# Patient Record
Sex: FEMALE | Race: WHITE | NOT HISPANIC OR LATINO | URBAN - METROPOLITAN AREA
[De-identification: names, ages, dates, MRNs, and addresses within clinical notes are randomized per-mention and may not be internally consistent; named-entity substitution may affect disease eponyms.]

---

## 2018-01-01 ENCOUNTER — EMERGENCY (EMERGENCY)
Facility: HOSPITAL | Age: 58
LOS: 0 days | Discharge: HOME | End: 2018-01-01
Attending: EMERGENCY MEDICINE | Admitting: EMERGENCY MEDICINE
Payer: COMMERCIAL

## 2018-01-01 VITALS
SYSTOLIC BLOOD PRESSURE: 131 MMHG | OXYGEN SATURATION: 100 % | DIASTOLIC BLOOD PRESSURE: 79 MMHG | RESPIRATION RATE: 17 BRPM | HEART RATE: 95 BPM | TEMPERATURE: 99 F

## 2018-01-01 VITALS — HEIGHT: 64 IN | WEIGHT: 164.91 LBS

## 2018-01-01 DIAGNOSIS — R21 RASH AND OTHER NONSPECIFIC SKIN ERUPTION: ICD-10-CM

## 2018-01-01 DIAGNOSIS — R11.2 NAUSEA WITH VOMITING, UNSPECIFIED: ICD-10-CM

## 2018-01-01 PROCEDURE — 99283 EMERGENCY DEPT VISIT LOW MDM: CPT

## 2018-01-01 RX ORDER — FAMOTIDINE 10 MG/ML
1 INJECTION INTRAVENOUS
Qty: 4 | Refills: 0 | OUTPATIENT
Start: 2018-01-01 | End: 2018-01-04

## 2018-01-01 RX ORDER — PANTOPRAZOLE SODIUM 20 MG/1
0 TABLET, DELAYED RELEASE ORAL
Qty: 0 | Refills: 0 | COMMUNITY

## 2018-01-01 RX ORDER — FAMOTIDINE 10 MG/ML
20 INJECTION INTRAVENOUS ONCE
Qty: 0 | Refills: 0 | Status: COMPLETED | OUTPATIENT
Start: 2018-01-01 | End: 2018-01-01

## 2018-01-01 RX ORDER — DIPHENHYDRAMINE HCL 50 MG
50 CAPSULE ORAL ONCE
Qty: 0 | Refills: 0 | Status: COMPLETED | OUTPATIENT
Start: 2018-01-01 | End: 2018-01-01

## 2018-01-01 RX ORDER — SERTRALINE 25 MG/1
0 TABLET, FILM COATED ORAL
Qty: 0 | Refills: 0 | COMMUNITY

## 2018-01-01 RX ADMIN — Medication 60 MILLIGRAM(S): at 16:33

## 2018-01-01 RX ADMIN — Medication 50 MILLIGRAM(S): at 16:32

## 2018-01-01 RX ADMIN — FAMOTIDINE 20 MILLIGRAM(S): 10 INJECTION INTRAVENOUS at 16:33

## 2018-01-01 NOTE — ED STATDOCS - ATTENDING CONTRIBUTION TO CARE
I, Elia Hernandez MD,  performed the initial face to face bedside interview with this patient regarding history of present illness, review of symptoms and relevant past medical, social and family history.  I completed an independent physical examination.  I was the initial provider who evaluated this patient. I have signed out the follow up of any pending tests (i.e. labs, radiological studies) to the ACP.  I have communicated the patient’s plan of care and disposition with the ACP.  The history, relevant review of systems, past medical and surgical history, medical decision making, and physical examination was documented by the scribe in my presence and I attest to the accuracy of the documentation.  I, Elia Hernandez MD, personally saw the patient with ACP.  I have personally performed a face to face diagnostic evaluation on this patient.  I have reviewed the ACP note and agree with the history, exam, and plan of care, except as noted.

## 2018-01-01 NOTE — ED STATDOCS - OBJECTIVE STATEMENT
56 yo female presents with rash x 2 days.  Had UTI last week, was prescribed bactrim.  Around last Weds she developed a head cold, nausea, vomiting.  Rash began 2 days ago on her arms, then spread to her back and abdomen, then to her legs.  Denies throat itching or difficulty breathing.  Denies new topical products.  Has taken bactrim in the past without issues.  Took benadryl at home with some relief.

## 2018-01-01 NOTE — ED STATDOCS - PROGRESS NOTE DETAILS
signed Iram Cabrera PA-C Pt seen and evaluated initially in intake by Dr Hernandez.   Pt here with urticaric rash with wheals x 2 days, unclear etiology. No airway involvement, improves with benadryl. Plan steroids, pepcid, benadryl, outpt f/u with pts derm Pt feeling well, pt and family agree with DC and plan of care.

## 2018-01-01 NOTE — ED ADULT NURSE NOTE - OBJECTIVE STATEMENT
rash over entire body started on Saturday.  Wore new Pajamas without washing them. benadryl taken at home with some relief but rash continues to spread. Itchy red raised rash over entire body.  No shortness of breath no rash on face .

## 2019-04-16 ENCOUNTER — EMERGENCY (EMERGENCY)
Facility: HOSPITAL | Age: 59
LOS: 0 days | Discharge: HOME | End: 2019-04-16
Attending: EMERGENCY MEDICINE | Admitting: EMERGENCY MEDICINE
Payer: COMMERCIAL

## 2019-04-16 VITALS
HEART RATE: 84 BPM | SYSTOLIC BLOOD PRESSURE: 153 MMHG | RESPIRATION RATE: 18 BRPM | TEMPERATURE: 98 F | DIASTOLIC BLOOD PRESSURE: 93 MMHG | OXYGEN SATURATION: 97 %

## 2019-04-16 VITALS — WEIGHT: 149.91 LBS | HEIGHT: 59 IN

## 2019-04-16 DIAGNOSIS — Z77.098 CONTACT WITH AND (SUSPECTED) EXPOSURE TO OTHER HAZARDOUS, CHIEFLY NONMEDICINAL, CHEMICALS: ICD-10-CM

## 2019-04-16 DIAGNOSIS — R09.89 OTHER SPECIFIED SYMPTOMS AND SIGNS INVOLVING THE CIRCULATORY AND RESPIRATORY SYSTEMS: ICD-10-CM

## 2019-04-16 PROCEDURE — 70360 X-RAY EXAM OF NECK: CPT | Mod: 26

## 2019-04-16 PROCEDURE — 71046 X-RAY EXAM CHEST 2 VIEWS: CPT | Mod: 26

## 2019-04-16 PROCEDURE — 99284 EMERGENCY DEPT VISIT MOD MDM: CPT

## 2019-04-16 NOTE — ED STATDOCS - ATTENDING CONTRIBUTION TO CARE
I, Celeste Ledesma MD,  performed the initial face to face bedside interview with this patient regarding history of present illness, review of symptoms and relevant past medical, social and family history.  I completed an independent physical examination.  I was the initial provider who evaluated this patient. I have signed out the follow up of any pending tests (i.e. labs, radiological studies) to the ACP.  I have communicated the patient’s plan of care and disposition with the ACP.  The history, relevant review of systems, past medical and surgical history, medical decision making, and physical examination was documented by the scribe in my presence and I attest to the accuracy of the documentation.

## 2019-04-16 NOTE — ED STATDOCS - OBJECTIVE STATEMENT
57 y/o female with a PMHx of HLD presents to the ED c/o allergic reaction. Pt notes she was cleaning the bathroom with Clorox  for an hour and a half. Pt notes she had to step out a few times. After cleaning, pt notes she could not talk because she had no voice and had throat irritation. Pt notes she felt like she can not take a deep breath. Pt called Dr. Pierre and was told to come to ED. +throat irritation, HA. Sx are now resolving. As per family, pt has had a reaction for bleach in the past. Nonsmoker. No other complaints at this time.
diet modification/medication therapy

## 2019-04-16 NOTE — ED ADULT NURSE NOTE - CHPI ED NUR SYMPTOMS NEG
no nausea/no dizziness/no vomiting/no chills/no decreased eating/drinking/no tingling/no weakness/no pain/no fever

## 2019-04-16 NOTE — ED ADULT TRIAGE NOTE - CHIEF COMPLAINT QUOTE
patient states she was cleaning bathroom with bleach for 1.5 hours, at 11 am she had throat irritation, voice changes, cough and chest discomfort with deep inspiration.  Sx are resolving.  She called dr mak and was instructed to come to ER.  Voice is improving and more reflective of baseline. Slight HA, took tylenol at 2pm

## 2019-04-16 NOTE — ED STATDOCS - PROGRESS NOTE DETAILS
57 y/o female with a PMHx of HLD presents to the ED c/o allergic reaction. Pt notes she was cleaning the bathroom with Clorox  for an hour and a half. Pt notes she had to step out a few times. After cleaning, pt notes she could not talk because she had no voice and had throat irritation. Pt notes she felt like she can not take a deep breath. Pt called Dr. Pierre and was told to come to ED. +throat irritation, HA. Sx are now resolving. As per family, pt has had a reaction for bleach in the past. Nonsmoker. No other complaints at this time.  Tana Porter PA-C CXR Neg.  Soft tissue neck neg.  Vitals stable.  will LAYLA.  Tana Porter PA-C

## 2019-04-16 NOTE — ED ADULT NURSE NOTE - NSIMPLEMENTINTERV_GEN_ALL_ED
Implemented All Universal Safety Interventions:  [place] to call system. Call bell, personal items and telephone within reach. Instruct patient to call for assistance. Room bathroom lighting operational. Non-slip footwear when patient is off stretcher. Physically safe environment: no spills, clutter or unnecessary equipment. Stretcher in lowest position, wheels locked, appropriate side rails in place.

## 2019-04-16 NOTE — ED STATDOCS - CLINICAL SUMMARY MEDICAL DECISION MAKING FREE TEXT BOX
Pt with throat tightness. Plan: XR. Pt with throat tightness. Plan: XR.    CXR Neg.  Soft tissue neck neg.  Vitals stable.  will LAYLA.  Tana Porter PA-C

## 2019-06-25 PROBLEM — E78.5 HYPERLIPIDEMIA, UNSPECIFIED: Chronic | Status: ACTIVE | Noted: 2019-04-17

## 2019-07-03 ENCOUNTER — APPOINTMENT (OUTPATIENT)
Dept: GASTROENTEROLOGY | Facility: CLINIC | Age: 59
End: 2019-07-03
Payer: COMMERCIAL

## 2019-07-03 VITALS
HEART RATE: 85 BPM | WEIGHT: 150 LBS | BODY MASS INDEX: 30.24 KG/M2 | DIASTOLIC BLOOD PRESSURE: 84 MMHG | HEIGHT: 59 IN | SYSTOLIC BLOOD PRESSURE: 126 MMHG

## 2019-07-03 PROCEDURE — 99213 OFFICE O/P EST LOW 20 MIN: CPT

## 2019-07-03 NOTE — PHYSICAL EXAM
[General Appearance - Alert] : alert [Auscultation Breath Sounds / Voice Sounds] : lungs were clear to auscultation bilaterally [General Appearance - In No Acute Distress] : in no acute distress [Heart Sounds] : normal S1 and S2 [Heart Sounds Gallop] : no gallops [Heart Rate And Rhythm] : heart rate was normal and rhythm regular [Murmurs] : no murmurs [Heart Sounds Pericardial Friction Rub] : no pericardial rub [Abdomen Tenderness] : non-tender [Bowel Sounds] : normal bowel sounds [Abdomen Soft] : soft [] : no hepato-splenomegaly [Abdomen Mass (___ Cm)] : no abdominal mass palpated

## 2019-07-03 NOTE — HISTORY OF PRESENT ILLNESS
[de-identified] : 59 yo female with recent episode of lower abdominal pain and diarrhea. Patient had not been on antibiotics. There been no recent travel, question contamination from gluten. Patient went to primary medical doctor where she had normal lab tests normal stool specimens. A CT scan showed collapsed bowel but otherwise was normal. Patient is now back to normal and tolerating a regular diet. There was no fevers with this episode.

## 2019-07-03 NOTE — ASSESSMENT
[FreeTextEntry1] : 59 yo female with recent gastroenteritis. Symptoms have resolved at this point. Patient advised to stay on a lactose-free diet and antibiotics. Call back if symptoms recur.

## 2019-09-09 ENCOUNTER — TRANSCRIPTION ENCOUNTER (OUTPATIENT)
Age: 59
End: 2019-09-09

## 2019-09-12 ENCOUNTER — EMERGENCY (EMERGENCY)
Facility: HOSPITAL | Age: 59
LOS: 0 days | Discharge: HOME | End: 2019-09-12
Attending: EMERGENCY MEDICINE
Payer: COMMERCIAL

## 2019-09-12 VITALS
DIASTOLIC BLOOD PRESSURE: 85 MMHG | HEART RATE: 99 BPM | TEMPERATURE: 98 F | RESPIRATION RATE: 16 BRPM | SYSTOLIC BLOOD PRESSURE: 135 MMHG | OXYGEN SATURATION: 100 %

## 2019-09-12 VITALS — HEIGHT: 65 IN | WEIGHT: 160.06 LBS

## 2019-09-12 DIAGNOSIS — M54.89 OTHER DORSALGIA: ICD-10-CM

## 2019-09-12 DIAGNOSIS — E78.5 HYPERLIPIDEMIA, UNSPECIFIED: ICD-10-CM

## 2019-09-12 DIAGNOSIS — Z88.5 ALLERGY STATUS TO NARCOTIC AGENT: ICD-10-CM

## 2019-09-12 DIAGNOSIS — R07.9 CHEST PAIN, UNSPECIFIED: ICD-10-CM

## 2019-09-12 DIAGNOSIS — Z88.1 ALLERGY STATUS TO OTHER ANTIBIOTIC AGENTS STATUS: ICD-10-CM

## 2019-09-12 DIAGNOSIS — E03.9 HYPOTHYROIDISM, UNSPECIFIED: ICD-10-CM

## 2019-09-12 DIAGNOSIS — Z98.890 OTHER SPECIFIED POSTPROCEDURAL STATES: ICD-10-CM

## 2019-09-12 DIAGNOSIS — R11.10 VOMITING, UNSPECIFIED: ICD-10-CM

## 2019-09-12 DIAGNOSIS — M54.9 DORSALGIA, UNSPECIFIED: ICD-10-CM

## 2019-09-12 DIAGNOSIS — K21.9 GASTRO-ESOPHAGEAL REFLUX DISEASE WITHOUT ESOPHAGITIS: ICD-10-CM

## 2019-09-12 DIAGNOSIS — R11.2 NAUSEA WITH VOMITING, UNSPECIFIED: ICD-10-CM

## 2019-09-12 DIAGNOSIS — Z87.891 PERSONAL HISTORY OF NICOTINE DEPENDENCE: ICD-10-CM

## 2019-09-12 DIAGNOSIS — K44.9 DIAPHRAGMATIC HERNIA WITHOUT OBSTRUCTION OR GANGRENE: ICD-10-CM

## 2019-09-12 DIAGNOSIS — Z82.49 FAMILY HISTORY OF ISCHEMIC HEART DISEASE AND OTHER DISEASES OF THE CIRCULATORY SYSTEM: ICD-10-CM

## 2019-09-12 DIAGNOSIS — Z88.0 ALLERGY STATUS TO PENICILLIN: ICD-10-CM

## 2019-09-12 PROCEDURE — 71275 CT ANGIOGRAPHY CHEST: CPT | Mod: 26

## 2019-09-12 PROCEDURE — 96374 THER/PROPH/DIAG INJ IV PUSH: CPT

## 2019-09-12 PROCEDURE — 99285 EMERGENCY DEPT VISIT HI MDM: CPT

## 2019-09-12 PROCEDURE — 71046 X-RAY EXAM CHEST 2 VIEWS: CPT | Mod: 26

## 2019-09-12 PROCEDURE — 93010 ELECTROCARDIOGRAM REPORT: CPT

## 2019-09-12 PROCEDURE — 84484 ASSAY OF TROPONIN QUANT: CPT

## 2019-09-12 PROCEDURE — 83690 ASSAY OF LIPASE: CPT

## 2019-09-12 PROCEDURE — 83735 ASSAY OF MAGNESIUM: CPT

## 2019-09-12 PROCEDURE — 99284 EMERGENCY DEPT VISIT MOD MDM: CPT | Mod: 25

## 2019-09-12 PROCEDURE — 85025 COMPLETE CBC W/AUTO DIFF WBC: CPT

## 2019-09-12 PROCEDURE — 71275 CT ANGIOGRAPHY CHEST: CPT

## 2019-09-12 PROCEDURE — 83880 ASSAY OF NATRIURETIC PEPTIDE: CPT

## 2019-09-12 PROCEDURE — 71046 X-RAY EXAM CHEST 2 VIEWS: CPT

## 2019-09-12 PROCEDURE — 85610 PROTHROMBIN TIME: CPT

## 2019-09-12 PROCEDURE — 80053 COMPREHEN METABOLIC PANEL: CPT

## 2019-09-12 PROCEDURE — 93005 ELECTROCARDIOGRAM TRACING: CPT

## 2019-09-12 PROCEDURE — 85379 FIBRIN DEGRADATION QUANT: CPT

## 2019-09-12 PROCEDURE — 85730 THROMBOPLASTIN TIME PARTIAL: CPT

## 2019-09-12 PROCEDURE — 36415 COLL VENOUS BLD VENIPUNCTURE: CPT

## 2019-09-12 RX ORDER — LIDOCAINE 4 G/100G
1 CREAM TOPICAL ONCE
Refills: 0 | Status: COMPLETED | OUTPATIENT
Start: 2019-09-12 | End: 2019-09-12

## 2019-09-12 RX ORDER — SODIUM CHLORIDE 9 MG/ML
1000 INJECTION INTRAMUSCULAR; INTRAVENOUS; SUBCUTANEOUS ONCE
Refills: 0 | Status: COMPLETED | OUTPATIENT
Start: 2019-09-12 | End: 2019-09-12

## 2019-09-12 RX ORDER — FAMOTIDINE 10 MG/ML
20 INJECTION INTRAVENOUS ONCE
Refills: 0 | Status: COMPLETED | OUTPATIENT
Start: 2019-09-12 | End: 2019-09-12

## 2019-09-12 RX ORDER — ACETAMINOPHEN 500 MG
975 TABLET ORAL ONCE
Refills: 0 | Status: COMPLETED | OUTPATIENT
Start: 2019-09-12 | End: 2019-09-12

## 2019-09-12 RX ADMIN — SODIUM CHLORIDE 1000 MILLILITER(S): 9 INJECTION INTRAMUSCULAR; INTRAVENOUS; SUBCUTANEOUS at 20:07

## 2019-09-12 RX ADMIN — LIDOCAINE 1 PATCH: 4 CREAM TOPICAL at 20:11

## 2019-09-12 RX ADMIN — FAMOTIDINE 20 MILLIGRAM(S): 10 INJECTION INTRAVENOUS at 20:07

## 2019-09-12 RX ADMIN — Medication 975 MILLIGRAM(S): at 17:45

## 2019-09-12 RX ADMIN — Medication 975 MILLIGRAM(S): at 20:10

## 2019-09-12 RX ADMIN — LIDOCAINE 1 PATCH: 4 CREAM TOPICAL at 17:44

## 2019-09-12 NOTE — ED ADULT NURSE NOTE - NSIMPLEMENTINTERV_GEN_ALL_ED
Implemented All Fall Risk Interventions:  [place] to call system. Call bell, personal items and telephone within reach. Instruct patient to call for assistance. Room bathroom lighting operational. Non-slip footwear when patient is off stretcher. Physically safe environment: no spills, clutter or unnecessary equipment. Stretcher in lowest position, wheels locked, appropriate side rails in place. Provide visual cue, wrist band, yellow gown, etc. Monitor gait and stability. Monitor for mental status changes and reorient to person, place, and time. Review medications for side effects contributing to fall risk. Reinforce activity limits and safety measures with patient and family.

## 2019-09-12 NOTE — ED PROVIDER NOTE - PHYSICAL EXAMINATION
*GEN: NAD; well appearing; A+O x3   *HEAD: NC/AT   *EYES/NOSE: PERRL & EOMI b/l  *THROAT: airway patent, moist mucous membranes  *NECK: Neck supple, no masses  *PULMONARY: CTA b/l, symmetric breath sounds.   *CARDIAC: s1s2, regular rhythm, no Murmur  *ABDOMEN:  ND, NT, soft, no guarding, no rebound, no masses   *BACK: no CVA tenderness, Normal  spine +TTP right scapula region, reproducible pain  *EXTREMITIES: symmetric pulses, 2+ dp & radial pulses, capillary refill < 2 seconds, no cyanosis, no edema   *SKIN: no rash or bruising +incision site clean and dry, staples intact  *NEUROLOGIC: alert, CN 2-12 intact, moves all 4 extremities, full active & passive ROM in all extremities, normal baseline gait  *PSYCH: insight and judgment nl, memory nl, affect nl, thought nl

## 2019-09-12 NOTE — ED PROVIDER NOTE - OBJECTIVE STATEMENT
Pertinent HPI/PMH/PSH/FHx/SHx and Review of Systems contained within  HPI: 59 yo female p/w CC back pain and vomiting since yesterday. Pt reports feeling onset of sweats and chills last night. Pt then experienced upper back pain and vomiting. Back pain was constant for 3 hours since onset, radiated and wrapped around the right side towards the chest. Pain does not worsen with deep breathing, movement, or walking. Pain does exacerbated with palpation. Nausea has self resolved. States she has back pain every now and then, but has never had pain to this degree. Last stress test 2-3 years ago, insignificant. Former smoker. On Lovenox injection.   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 Pt reports feeling onset of sweats and chills last night. Pt then experienced upper back pain and vomiting. Back pain was constant for 3 hours since onset, radiated and wrapped around the right side towards the chest. Pain does not worsen with deep breathing, movement, or walking. Pain does get worse with palpation. Nausea has self resolved. States she has back pain every now and then, but has never had pain to this degree. Last stress test 2-3 years ago, insignificant. Former smoker. On Lovenox injection for dvt ppx for recent surgery.  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

## 2019-09-12 NOTE — ED PROVIDER NOTE - CLINICAL SUMMARY MEDICAL DECISION MAKING FREE TEXT BOX
Likely MSK pain, given Fhx and recent surgery with evaluate for ACS. Pt already on Lovenox injection, no signs for DVT. Likely MSK pain, given Fhx and recent surgery with evaluate for ACS. Pt already on Lovenox injection, no signs for DVT.    Workup negative for emergent finding, symptoms improved, patient to f/u

## 2019-09-12 NOTE — ED PROVIDER NOTE - PATIENT PORTAL LINK FT
You can access the FollowMyHealth Patient Portal offered by [facility] by registering at the following website: [email or web address]. By joining [employer]’s FollowMyHealth portal, you will also be able to view your health information using other applications (apps) compatible with our system.

## 2019-09-12 NOTE — ED ADULT TRIAGE NOTE - CHIEF COMPLAINT QUOTE
Pt c/o vomiting , right upper back pain and abdominal pain, pt recently had tumor removed from left knee

## 2019-09-12 NOTE — ED PROVIDER NOTE - ATTENDING CONTRIBUTION TO CARE
I, Gerry Avilez MD,  performed the initial face to face bedside interview with this patient regarding history of present illness, review of symptoms and relevant past medical, social and family history.  I completed an independent physical examination.  I was the initial provider who evaluated this patient. I have signed out the follow up of any pending tests (i.e. labs, radiological studies) to the ACP.  I have communicated the patient’s plan of care and disposition with the ACP.

## 2019-09-12 NOTE — ED PROVIDER NOTE - PROGRESS NOTE DETAILS
Patient intially seen and evaluated with ED attending at intake.  REporting some back pain radiating around to her R chest with vomiting.  Symptoms improving but still present during initial presentation.  As she had a recent surgical procedure, ddimer ordered to assess for PE, which was elevated.  CTA negative, showed hiatal hernia.  Patient aware of this, is on pantoprazole.  At this time symptoms are completely resolved.  Suspect possible GERD flare up or muscular cause of symptoms.  Supplied a copy of all results, reviewed return precautions and follow up -Francy Ambrosio PA-C

## 2019-09-13 DIAGNOSIS — Z90.710 ACQUIRED ABSENCE OF BOTH CERVIX AND UTERUS: Chronic | ICD-10-CM

## 2019-12-16 ENCOUNTER — RX RENEWAL (OUTPATIENT)
Age: 59
End: 2019-12-16

## 2019-12-16 RX ORDER — PANTOPRAZOLE 40 MG/1
40 TABLET, DELAYED RELEASE ORAL
Qty: 180 | Refills: 3 | Status: ACTIVE | COMMUNITY
Start: 2019-12-16 | End: 1900-01-01

## 2020-04-14 ENCOUNTER — RX RENEWAL (OUTPATIENT)
Age: 60
End: 2020-04-14

## 2020-04-14 DIAGNOSIS — Z87.19 PERSONAL HISTORY OF OTHER DISEASES OF THE DIGESTIVE SYSTEM: ICD-10-CM

## 2020-04-14 RX ORDER — CHLORDIAZEPOXIDE HYDROCHLORIDE AND CLIDINIUM BROMIDE 5; 2.5 MG/1; MG/1
5-2.5 CAPSULE, GELATIN COATED ORAL
Qty: 270 | Refills: 0 | Status: ACTIVE | COMMUNITY
Start: 2020-04-14 | End: 1900-01-01

## 2020-12-08 ENCOUNTER — RX RENEWAL (OUTPATIENT)
Age: 60
End: 2020-12-08

## 2021-01-13 ENCOUNTER — TRANSCRIPTION ENCOUNTER (OUTPATIENT)
Age: 61
End: 2021-01-13

## 2021-11-02 ENCOUNTER — RX RENEWAL (OUTPATIENT)
Age: 61
End: 2021-11-02

## 2021-11-02 RX ORDER — PANTOPRAZOLE 40 MG/1
40 TABLET, DELAYED RELEASE ORAL DAILY
Qty: 180 | Refills: 1 | Status: ACTIVE | COMMUNITY
Start: 2019-12-16 | End: 1900-01-01

## 2022-08-03 PROBLEM — E03.9 HYPOTHYROIDISM, UNSPECIFIED: Chronic | Status: ACTIVE | Noted: 2019-09-13

## 2022-08-03 PROBLEM — K21.9 GASTRO-ESOPHAGEAL REFLUX DISEASE WITHOUT ESOPHAGITIS: Chronic | Status: ACTIVE | Noted: 2019-09-13

## 2022-08-09 ENCOUNTER — APPOINTMENT (OUTPATIENT)
Dept: GASTROENTEROLOGY | Facility: CLINIC | Age: 62
End: 2022-08-09

## 2022-08-09 VITALS
WEIGHT: 145 LBS | HEART RATE: 90 BPM | HEIGHT: 59 IN | DIASTOLIC BLOOD PRESSURE: 97 MMHG | BODY MASS INDEX: 29.23 KG/M2 | SYSTOLIC BLOOD PRESSURE: 140 MMHG

## 2022-08-09 DIAGNOSIS — K52.9 NONINFECTIVE GASTROENTERITIS AND COLITIS, UNSPECIFIED: ICD-10-CM

## 2022-08-09 DIAGNOSIS — Z12.11 ENCOUNTER FOR SCREENING FOR MALIGNANT NEOPLASM OF COLON: ICD-10-CM

## 2022-08-09 DIAGNOSIS — K21.9 GASTRO-ESOPHAGEAL REFLUX DISEASE W/OUT ESOPHAGITIS: ICD-10-CM

## 2022-08-09 PROCEDURE — 99203 OFFICE O/P NEW LOW 30 MIN: CPT

## 2022-08-09 RX ORDER — SODIUM PICOSULFATE, MAGNESIUM OXIDE, AND ANHYDROUS CITRIC ACID 10; 3.5; 12 MG/160ML; G/160ML; G/160ML
10-3.5-12 MG-GM LIQUID ORAL
Qty: 1 | Refills: 0 | Status: ACTIVE | COMMUNITY
Start: 2022-08-09 | End: 1900-01-01

## 2022-08-09 NOTE — ASSESSMENT
[FreeTextEntry1] : Josiane Moon is a 61 year old female no PMH presenting today for follow up visit. Reports is feeling well since Sunday, however last week was experiencing diarrhea for approximately 9 days, reports associated abdominal cramping but denies fever, nausea or vomiting. Denies any recent travel, has eaten out but reports no one else has been ill. Took immodium twice without relief. Otherwise pt reports unchanged PMH, reports typically having bowel  movements every other day without straining or blood. Does report breakthrough GERD symptoms despite pantoprazole 40MG BID. Denies dysphagia.\par \par Plan:\par Since diarrhea and abdominal cramping resolved on its own, likely viral in nature. Educated pt on importance of oral hydration and BRAT diet. Since due for screening, will schedule colonoscopy. Recommend EGD as well since pt reports persistent GERD despite PPI use. Reviewed risks versus benefits as well as instructions, pt agrees to procedure. Discussed with Dr. Gold.

## 2022-08-09 NOTE — PHYSICAL EXAM
[General Appearance - Alert] : alert [General Appearance - In No Acute Distress] : in no acute distress [Sclera] : the sclera and conjunctiva were normal [] : no respiratory distress [Respiration, Rhythm And Depth] : normal respiratory rhythm and effort [Heart Rate And Rhythm] : heart rate was normal and rhythm regular [Bowel Sounds] : normal bowel sounds [Abdomen Soft] : soft [Abdomen Tenderness] : non-tender [Abnormal Walk] : normal gait [Skin Color & Pigmentation] : normal skin color and pigmentation [Oriented To Time, Place, And Person] : oriented to person, place, and time

## 2022-08-09 NOTE — HISTORY OF PRESENT ILLNESS
[de-identified] : Josiane Moon is a 61 year old female no PMH presenting today for follow up visit. Reports is feeling well since Sunday, however last week was experiencing diarrhea for approximately 9 days, reports associated abdominal cramping but denies fever, nausea or vomiting. Denies any recent travel, has eaten out but reports no one else has been ill. Took immodium twice without relief. Otherwise pt reports unchanged PMH, reports typically having bowel  movements every other day without straining or blood. Does report breakthrough GERD symptoms despite pantoprazole 40MG BID. Denies dysphagia. Last colonoscopy in 2016, recommended to repat in 5 years.

## 2022-08-15 DIAGNOSIS — R19.7 DIARRHEA, UNSPECIFIED: ICD-10-CM

## 2022-08-17 LAB
C DIFF TOX GENS STL QL NAA+PROBE: NORMAL
CDIFF BY PCR: NOT DETECTED

## 2022-08-19 LAB — GI PCR PANEL, STOOL: ABNORMAL

## 2022-10-06 ENCOUNTER — APPOINTMENT (OUTPATIENT)
Dept: GASTROENTEROLOGY | Facility: AMBULATORY MEDICAL SERVICES | Age: 62
End: 2022-10-06

## 2022-10-06 ENCOUNTER — RESULT REVIEW (OUTPATIENT)
Age: 62
End: 2022-10-06

## 2022-10-06 PROCEDURE — 43239 EGD BIOPSY SINGLE/MULTIPLE: CPT

## 2022-10-06 PROCEDURE — 45380 COLONOSCOPY AND BIOPSY: CPT

## 2022-10-11 ENCOUNTER — NON-APPOINTMENT (OUTPATIENT)
Age: 62
End: 2022-10-11

## 2022-11-02 ENCOUNTER — APPOINTMENT (OUTPATIENT)
Dept: GASTROENTEROLOGY | Facility: CLINIC | Age: 62
End: 2022-11-02

## 2023-06-06 NOTE — ED STATDOCS - MDM ORDERS SUBMITTED SELECTION
[de-identified] : Well-healed incisions [de-identified] : Looks well in no distress, of stated age. Imaging Studies

## 2023-09-06 ENCOUNTER — NON-APPOINTMENT (OUTPATIENT)
Age: 63
End: 2023-09-06

## 2023-09-08 ENCOUNTER — NON-APPOINTMENT (OUTPATIENT)
Age: 63
End: 2023-09-08

## 2024-06-28 ENCOUNTER — NON-APPOINTMENT (OUTPATIENT)
Age: 64
End: 2024-06-28

## 2024-11-25 NOTE — ED ADULT NURSE NOTE - NS ED NOTE ABUSE RESPONSE YN
Increase Lisinopril to 20 mg daily  Routine labs before next office visit: CMP, BNP, CBC  Routine FASTING labs soon: CRP, lipid, bnp, bmp  Sign up for [facility] and then contact our office and we will send the colchicine prescription to them  Follow up with Cece Betancur MD in 4 months  
Yes

## 2024-12-05 ENCOUNTER — NON-APPOINTMENT (OUTPATIENT)
Age: 64
End: 2024-12-05

## 2025-02-17 ENCOUNTER — NON-APPOINTMENT (OUTPATIENT)
Age: 65
End: 2025-02-17